# Patient Record
Sex: MALE | URBAN - NONMETROPOLITAN AREA
[De-identification: names, ages, dates, MRNs, and addresses within clinical notes are randomized per-mention and may not be internally consistent; named-entity substitution may affect disease eponyms.]

---

## 2023-06-16 ENCOUNTER — OFFICE VISIT (OUTPATIENT)
Dept: PRIMARY CARE | Facility: CLINIC | Age: 33
End: 2023-06-16
Payer: COMMERCIAL

## 2023-06-16 VITALS
BODY MASS INDEX: 24.45 KG/M2 | WEIGHT: 184.5 LBS | HEIGHT: 73 IN | SYSTOLIC BLOOD PRESSURE: 128 MMHG | HEART RATE: 74 BPM | DIASTOLIC BLOOD PRESSURE: 88 MMHG

## 2023-06-16 DIAGNOSIS — M94.0 COSTOCHONDRITIS: Primary | ICD-10-CM

## 2023-06-16 DIAGNOSIS — F41.1 GENERALIZED ANXIETY DISORDER: ICD-10-CM

## 2023-06-16 DIAGNOSIS — F84.5 ASPERGER SYNDROME (HHS-HCC): ICD-10-CM

## 2023-06-16 PROCEDURE — 99203 OFFICE O/P NEW LOW 30 MIN: CPT | Performed by: FAMILY MEDICINE

## 2023-06-16 PROCEDURE — 1036F TOBACCO NON-USER: CPT | Performed by: FAMILY MEDICINE

## 2023-06-16 RX ORDER — BUSPIRONE HYDROCHLORIDE 10 MG/1
15 TABLET ORAL 2 TIMES DAILY
COMMUNITY

## 2023-06-16 RX ORDER — MECLIZINE HYDROCHLORIDE 25 MG/1
25 TABLET ORAL 3 TIMES DAILY PRN
COMMUNITY
Start: 2023-02-22 | End: 2024-01-22 | Stop reason: ALTCHOICE

## 2023-06-16 RX ORDER — KETOROLAC TROMETHAMINE 10 MG/1
10 TABLET, FILM COATED ORAL 3 TIMES DAILY PRN
COMMUNITY
Start: 2023-02-22 | End: 2024-01-22 | Stop reason: ALTCHOICE

## 2023-06-16 RX ORDER — FLUOXETINE 10 MG/1
10 TABLET ORAL DAILY
COMMUNITY

## 2023-06-16 RX ORDER — LORAZEPAM 1 MG/1
1 TABLET ORAL EVERY 6 HOURS PRN
COMMUNITY

## 2023-06-16 ASSESSMENT — PATIENT HEALTH QUESTIONNAIRE - PHQ9
SUM OF ALL RESPONSES TO PHQ9 QUESTIONS 1 AND 2: 0
2. FEELING DOWN, DEPRESSED OR HOPELESS: NOT AT ALL
1. LITTLE INTEREST OR PLEASURE IN DOING THINGS: NOT AT ALL

## 2023-06-16 ASSESSMENT — ENCOUNTER SYMPTOMS
SHORTNESS OF BREATH: 0
COUGH: 0
NAUSEA: 1
VOMITING: 0
SINUS PRESSURE: 0

## 2023-06-16 NOTE — PROGRESS NOTES
"Subjective   Patient ID: Bright Edward is a 33 y.o. male who presents for New Patient Visit (Pt. Here to establish care./).    HPI  Mother present for history   Psychiatirist  tele [place]  [employer]   Has been [employer] in 6 th grade  ADHD did not do well with ritalin  and tried strattera      Some racing thoughts      Social and grades delayed bullyed in school        Panic Attacks and know symptoms      2 this year      Recognizes symptoms     Feb ED visit for nausea and dizziness had chest pain     Took antivert for 2 days     Toradol 10 mg  still has some tabs  left     Last taken every 2 to 3 weeks      Hiro lower ribs right > left      Bending makes worse sometimes     Flare up last 15 to 20 mintues     Cxr was normal feb 2023     [employer]    Works wiring and manufacturing used to work at [employer] had a severe Panic Attack     Migraine 2 x per month      Sleep helps and not debilitaiting            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    Respiratory:  Negative for cough and shortness of breath.    Gastrointestinal:  Positive for nausea (with vertigo). Negative for vomiting.       Objective   /88   Pulse 74   Ht 1.845 m (6' 0.64\")   Wt 83.7 kg (184 lb 8 oz)   BMI 24.59 kg/m²     Physical Exam  Constitutional:       Appearance: Normal appearance.   HENT:      Head: Normocephalic and atraumatic.   Eyes:      Conjunctiva/sclera: Conjunctivae normal.      Pupils: Pupils are equal, round, and reactive to light.   Cardiovascular:      Rate and Rhythm: Normal rate and regular rhythm.      Heart sounds: Normal heart sounds.   Pulmonary:      Effort: Pulmonary effort is normal.      Breath sounds: Normal breath sounds.   Abdominal:      General: Abdomen is flat. Bowel sounds are normal. There is no distension.      " Palpations: Abdomen is soft. There is no mass.      Tenderness: There is no abdominal tenderness. There is no guarding.   Musculoskeletal:      Comments: No tenderness to palpation of bilateral lower ribs   Lymphadenopathy:      Cervical: No cervical adenopathy.   Skin:     Coloration: Skin is not jaundiced.   Neurological:      General: No focal deficit present.      Mental Status: He is alert and oriented to person, place, and time.      Comments: Rodriguez shah     Psychiatric:         Mood and Affect: Mood normal.         Behavior: Behavior normal.         Thought Content: Thought content normal.         Judgment: Judgment normal.         Assessment/Plan   Diagnoses and all orders for this visit:  Costochondritis  Asperger syndrome  Generalized anxiety disorder       Meds will be manage by psychiatry  Toradol prn until gone then change to aleve.

## 2023-07-17 ENCOUNTER — OFFICE VISIT (OUTPATIENT)
Dept: PRIMARY CARE | Facility: CLINIC | Age: 33
End: 2023-07-17
Payer: COMMERCIAL

## 2023-07-17 VITALS
HEART RATE: 77 BPM | OXYGEN SATURATION: 96 % | WEIGHT: 209.5 LBS | TEMPERATURE: 98.2 F | DIASTOLIC BLOOD PRESSURE: 76 MMHG | SYSTOLIC BLOOD PRESSURE: 115 MMHG | BODY MASS INDEX: 27.92 KG/M2

## 2023-07-17 DIAGNOSIS — R06.02 SHORTNESS OF BREATH: Primary | ICD-10-CM

## 2023-07-17 DIAGNOSIS — J40 BRONCHITIS: ICD-10-CM

## 2023-07-17 DIAGNOSIS — R05.1 ACUTE COUGH: ICD-10-CM

## 2023-07-17 PROCEDURE — 1036F TOBACCO NON-USER: CPT | Performed by: STUDENT IN AN ORGANIZED HEALTH CARE EDUCATION/TRAINING PROGRAM

## 2023-07-17 PROCEDURE — 99213 OFFICE O/P EST LOW 20 MIN: CPT | Performed by: STUDENT IN AN ORGANIZED HEALTH CARE EDUCATION/TRAINING PROGRAM

## 2023-07-17 RX ORDER — AZITHROMYCIN 250 MG/1
TABLET, FILM COATED ORAL
Qty: 6 TABLET | Refills: 0 | Status: SHIPPED | OUTPATIENT
Start: 2023-07-17 | End: 2023-07-22

## 2023-07-17 RX ORDER — BENZONATATE 200 MG/1
200 CAPSULE ORAL 3 TIMES DAILY PRN
Qty: 42 CAPSULE | Refills: 0 | Status: SHIPPED | OUTPATIENT
Start: 2023-07-17 | End: 2023-08-16

## 2023-07-17 NOTE — PROGRESS NOTES
Subjective   Patient ID: Bright Edward is a 33 y.o. male who presents for evaluation of respiratory symptoms. Here with mother.    HPI     Runny nose, sore throat, headache, cough and nose bleeds and had some SOB. Fuzzy vision and is sore from coughing so much in his abdomen. Pressure in both inner ears. SX started Wednesday last week, then on the weekend it got worse, did take OTC meds and it did not improve, is now coughing up brown mucus, there is also times it is green and yellow. Denies fever. Denies chills. Did test for covid x2 and both have been negative. Is using a humidifier and does not seem to help. Is prone to nose bleeds and has bleeding now.     Review of Systems  ROS negative except discussed above in HPI.    Vitals:    07/17/23 1550   BP: 115/76   Pulse: 77   Temp: 36.8 °C (98.2 °F)   SpO2: 96%     Objective   Physical Exam  Constitutional:       Appearance: Normal appearance.   HENT:      Nose: Congestion present.      Comments: Dried blood in the right nostril is noticed.  Cardiovascular:      Rate and Rhythm: Normal rate and regular rhythm.   Pulmonary:      Effort: Pulmonary effort is normal.      Breath sounds: Wheezing and rales present.   Musculoskeletal:      Cervical back: Normal range of motion and neck supple.   Lymphadenopathy:      Cervical: No cervical adenopathy.   Neurological:      Mental Status: He is alert.           Assessment/Plan   Bright was seen today for sick.  Diagnoses and all orders for this visit:  Shortness of breath (Primary)  -     azithromycin (Zithromax) 250 mg tablet; Take 2 tablets (500 mg) by mouth once daily for 1 day, THEN 1 tablet (250 mg) once daily for 4 days. Take 2 tabs (500 mg) by mouth today, than 1 daily for 4 days..  -     benzonatate (Tessalon) 200 mg capsule; Take 1 capsule (200 mg) by mouth 3 times a day as needed for cough. Do not crush or chew.  -     XR chest 2 views; Future  -     XR chest 2 views  Acute cough  -     azithromycin  (Zithromax) 250 mg tablet; Take 2 tablets (500 mg) by mouth once daily for 1 day, THEN 1 tablet (250 mg) once daily for 4 days. Take 2 tabs (500 mg) by mouth today, than 1 daily for 4 days..  -     benzonatate (Tessalon) 200 mg capsule; Take 1 capsule (200 mg) by mouth 3 times a day as needed for cough. Do not crush or chew.  -     XR chest 2 views; Future  -     XR chest 2 views  Bronchitis  -     azithromycin (Zithromax) 250 mg tablet; Take 2 tablets (500 mg) by mouth once daily for 1 day, THEN 1 tablet (250 mg) once daily for 4 days. Take 2 tabs (500 mg) by mouth today, than 1 daily for 4 days..      Follow up as needed         Giovanni Cameron MD MPH

## 2023-07-25 ENCOUNTER — TELEPHONE (OUTPATIENT)
Dept: PRIMARY CARE | Facility: CLINIC | Age: 33
End: 2023-07-25

## 2023-07-25 ENCOUNTER — OFFICE VISIT (OUTPATIENT)
Dept: PRIMARY CARE | Facility: CLINIC | Age: 33
End: 2023-07-25
Payer: COMMERCIAL

## 2023-07-25 VITALS
DIASTOLIC BLOOD PRESSURE: 70 MMHG | WEIGHT: 210.8 LBS | BODY MASS INDEX: 28.09 KG/M2 | OXYGEN SATURATION: 99 % | HEART RATE: 72 BPM | SYSTOLIC BLOOD PRESSURE: 120 MMHG

## 2023-07-25 DIAGNOSIS — H66.90 ACUTE OTITIS MEDIA, UNSPECIFIED OTITIS MEDIA TYPE: ICD-10-CM

## 2023-07-25 DIAGNOSIS — H91.91 DECREASED HEARING OF RIGHT EAR: Primary | ICD-10-CM

## 2023-07-25 PROCEDURE — 1036F TOBACCO NON-USER: CPT | Performed by: STUDENT IN AN ORGANIZED HEALTH CARE EDUCATION/TRAINING PROGRAM

## 2023-07-25 PROCEDURE — 99213 OFFICE O/P EST LOW 20 MIN: CPT | Performed by: STUDENT IN AN ORGANIZED HEALTH CARE EDUCATION/TRAINING PROGRAM

## 2023-07-25 RX ORDER — DOXYCYCLINE 100 MG/1
100 CAPSULE ORAL 2 TIMES DAILY
Qty: 14 CAPSULE | Refills: 0 | Status: SHIPPED | OUTPATIENT
Start: 2023-07-25 | End: 2023-08-01

## 2023-07-25 NOTE — PROGRESS NOTES
Subjective   Patient ID: Bright Edward is a 33 y.o. male who presents for Sick ([facility] visit).    HPI    Reports that the meds worked for knocking out past sickness, he feels better, more energy. Still a sore dry throat. The ear pressure has stuck around, the right one is the worst than the left, states it is affecting his hearing. Cough is better sometimes still has coughing spells. Denies drainage from the ear. Denies odor from the ears. Denies fever. Denies dizziness. Sunday the pressure was more intense. States he cleans his ears regularly. Has tried OTC meds to see if it helps, denies helping.     Review of Systems  ROS negative except discussed above in HPI.    Vitals:    07/25/23 1632   BP: 120/70   Pulse: 72   SpO2: 99%     Objective   Physical Exam  Constitutional:       Appearance: Normal appearance.   HENT:      Ears:      Comments: Middle ear effusion noticed in the right ear.   Cardiovascular:      Rate and Rhythm: Normal rate and regular rhythm.      Pulses: Normal pulses.      Heart sounds: Normal heart sounds.   Pulmonary:      Effort: Pulmonary effort is normal.      Breath sounds: Normal breath sounds.   Neurological:      Mental Status: He is alert.           Assessment/Plan   Bright was seen today for sick.  Diagnoses and all orders for this visit:  Decreased hearing of right ear (Primary)  -     Referral to ENT; Future  Acute otitis media, unspecified otitis media type  -     doxycycline (Vibramycin) 100 mg capsule; Take 1 capsule (100 mg) by mouth 2 times a day for 7 days. Take with at least 8 ounces (large glass) of water, do not lie down for 30 minutes after      Follow up as needed     Giovanni Cameron MD MPH

## 2023-07-25 NOTE — TELEPHONE ENCOUNTER
Mom stating patient seen Dr. Cameron about a week ago. Stating he's feeling better, but having trouble hearing and feels like ears are stuffy. Asking what he should do. Asking for a call back at [phone].

## 2023-10-12 ENCOUNTER — TELEMEDICINE (OUTPATIENT)
Dept: PRIMARY CARE | Facility: CLINIC | Age: 33
End: 2023-10-12
Payer: COMMERCIAL

## 2023-10-12 DIAGNOSIS — R68.89 FLU-LIKE SYMPTOMS: Primary | ICD-10-CM

## 2023-10-12 PROCEDURE — 87636 SARSCOV2 & INF A&B AMP PRB: CPT

## 2023-10-12 PROCEDURE — 99213 OFFICE O/P EST LOW 20 MIN: CPT | Performed by: NURSE PRACTITIONER

## 2023-10-12 ASSESSMENT — ENCOUNTER SYMPTOMS
HEADACHES: 1
DIZZINESS: 0
LIGHT-HEADEDNESS: 0
ABDOMINAL PAIN: 0
TROUBLE SWALLOWING: 1
VOMITING: 0
DIAPHORESIS: 0
ACTIVITY CHANGE: 0
SWOLLEN GLANDS: 0
DIARRHEA: 0
NECK PAIN: 0
CHEST TIGHTNESS: 0
FATIGUE: 0
FEVER: 0
SORE THROAT: 1
CHILLS: 0
HOARSE VOICE: 1
STRIDOR: 1
SHORTNESS OF BREATH: 0
MYALGIAS: 1
COUGH: 1

## 2023-10-12 NOTE — PROGRESS NOTES
Subjective   Patient ID: Bright Edward is a 33 y.o. male who presents for covid.    Home COVID tests negative  Sx include sore throat, cough, joint aches, HA, nasal congestion.   Sx onset Monday  Family member COVID+  Denies fever, SOB        Sore Throat   This is a new problem. The current episode started in the past 7 days. The problem has been waxing and waning. Neither side of throat is experiencing more pain than the other. There has been no fever. The pain is at a severity of 5/10. Associated symptoms include congestion, coughing, headaches, a hoarse voice, stridor and trouble swallowing. Pertinent negatives include no abdominal pain, diarrhea, drooling, ear discharge, ear pain, plugged ear sensation, neck pain, shortness of breath, swollen glands or vomiting. He has had no exposure to strep or mono. He has tried acetaminophen for the symptoms.        Review of Systems   Constitutional:  Negative for activity change, chills, diaphoresis, fatigue and fever.   HENT:  Positive for congestion, hoarse voice, sore throat and trouble swallowing. Negative for drooling, ear discharge and ear pain.    Respiratory:  Positive for cough and stridor. Negative for chest tightness and shortness of breath.    Cardiovascular:  Negative for chest pain.   Gastrointestinal:  Negative for abdominal pain, diarrhea and vomiting.   Musculoskeletal:  Positive for myalgias. Negative for neck pain.   Neurological:  Positive for headaches. Negative for dizziness and light-headedness.       Objective   There were no vitals taken for this visit.    Physical Exam  Constitutional:       General: He is not in acute distress.     Appearance: Normal appearance. He is not ill-appearing.      Comments: Unable to perform complete physical exam due to virtual visit, patient was visualized on interactive video.    Pulmonary:      Effort: Pulmonary effort is normal.   Neurological:      Mental Status: He is alert and oriented to person, place,  and time.         Assessment/Plan   Diagnoses and all orders for this visit:  Flu-like symptoms  -     Sars-CoV-2 PCR, Symptomatic; Future  -     Influenza A, and B PCR; Future  Use OTC Tylenol as needed for pain/fever and Mucinex or Robitussin as needed for cough/congestion  Return if symptoms persist or worsen

## 2023-10-12 NOTE — LETTER
October 12, 2023     Patient: Bright Edward   YOB: 1990   Date of Visit: 10/12/2023       To Whom It May Concern:    Bright Edward was seen in my clinic on 10/12/2023 at 9:30 am. Please excuse Bright for his absence from work on this day to make the appointment.  Please excuse absence on 10/11/2023 due to illness    If you have any questions or concerns, please don't hesitate to call.         Sincerely,         Amaury French, APRN-CNP        CC: No Recipients

## 2023-10-13 ENCOUNTER — TELEPHONE (OUTPATIENT)
Dept: PRIMARY CARE | Facility: CLINIC | Age: 33
End: 2023-10-13
Payer: COMMERCIAL

## 2023-10-13 LAB
FLUAV RNA RESP QL NAA+PROBE: NOT DETECTED
FLUBV RNA RESP QL NAA+PROBE: NOT DETECTED
SARS-COV-2 RNA RESP QL NAA+PROBE: DETECTED

## 2023-12-15 ENCOUNTER — APPOINTMENT (OUTPATIENT)
Dept: PRIMARY CARE | Facility: CLINIC | Age: 33
End: 2023-12-15

## 2024-01-02 ENCOUNTER — APPOINTMENT (OUTPATIENT)
Dept: PRIMARY CARE | Facility: CLINIC | Age: 34
End: 2024-01-02

## 2024-01-22 ENCOUNTER — OFFICE VISIT (OUTPATIENT)
Dept: PRIMARY CARE | Facility: CLINIC | Age: 34
End: 2024-01-22
Payer: COMMERCIAL

## 2024-01-22 VITALS
HEIGHT: 74 IN | WEIGHT: 207.8 LBS | OXYGEN SATURATION: 96 % | HEART RATE: 78 BPM | BODY MASS INDEX: 26.67 KG/M2 | DIASTOLIC BLOOD PRESSURE: 80 MMHG | TEMPERATURE: 97.3 F | SYSTOLIC BLOOD PRESSURE: 118 MMHG

## 2024-01-22 DIAGNOSIS — Z13.220 SCREENING CHOLESTEROL LEVEL: Primary | ICD-10-CM

## 2024-01-22 PROCEDURE — 1036F TOBACCO NON-USER: CPT | Performed by: FAMILY MEDICINE

## 2024-01-22 PROCEDURE — 99213 OFFICE O/P EST LOW 20 MIN: CPT | Performed by: FAMILY MEDICINE

## 2024-01-22 NOTE — PROGRESS NOTES
"Subjective   Patient ID: Bright Edward is a 33 y.o. male who presents for Anxiety and Asperger Syndrome.    Anxiety           Psychiatrist  Dr Rogelio Baker rx ativan     Costochondritis occ takes aleve for hands and chest with twisting and turning   Gets brief sharpness and stab    Weight is down     Migraine    1 per week takes excedrin or tylenol if late in the day     No am FERNANDEZ     Patient is here today for a 6 month follow up:     No changes in his anxiety or Asperger Syndrome     He had Covid in October 2023     Has had  bloody nose due to dry air and the weather, he is using a humidifer with saline air   Ayr spray and neosporin  No trouble stopping nose bleeds     Fxhx DM MGF     Mother and GF have familial hyperlipidemia and on repatha  Pt father has good cholesterol       Review of Systems    Objective   /80 (BP Location: Left arm, Patient Position: Sitting)   Pulse 78   Temp 36.3 °C (97.3 °F)   Ht 1.867 m (6' 1.5\")   Wt 94.3 kg (207 lb 12.8 oz)   SpO2 96%   BMI 27.04 kg/m²     Physical Exam  Vitals reviewed.   Constitutional:       Appearance: Normal appearance.   HENT:      Head: Normocephalic and atraumatic.   Eyes:      Conjunctiva/sclera: Conjunctivae normal.   Cardiovascular:      Rate and Rhythm: Normal rate and regular rhythm.   Pulmonary:      Effort: Pulmonary effort is normal.      Breath sounds: Normal breath sounds.   Musculoskeletal:      Cervical back: Neck supple.   Skin:     General: Skin is warm and dry.   Neurological:      General: No focal deficit present.      Mental Status: He is alert and oriented to person, place, and time.   Psychiatric:         Mood and Affect: Mood normal.         Behavior: Behavior normal.         Thought Content: Thought content normal.         Judgment: Judgment normal.         Assessment/Plan   Diagnoses and all orders for this visit:  Screening cholesterol level  -     Lipid Panel; Future         "

## 2024-01-22 NOTE — PROGRESS NOTES
Patient is here today for a 6 month follow up:    No changes in his anxiety or Asperger Syndrome    He had Covid in October 2023    Has had  blood bleeds due to dry air and the weather, he is using a humidifer with saline air.

## 2025-01-27 ENCOUNTER — APPOINTMENT (OUTPATIENT)
Dept: PRIMARY CARE | Facility: CLINIC | Age: 35
End: 2025-01-27
Payer: COMMERCIAL

## 2025-01-27 VITALS
HEART RATE: 81 BPM | BODY MASS INDEX: 27.77 KG/M2 | SYSTOLIC BLOOD PRESSURE: 120 MMHG | WEIGHT: 213.4 LBS | OXYGEN SATURATION: 96 % | DIASTOLIC BLOOD PRESSURE: 72 MMHG

## 2025-01-27 DIAGNOSIS — Z13.220 SCREENING CHOLESTEROL LEVEL: ICD-10-CM

## 2025-01-27 DIAGNOSIS — F41.1 GENERALIZED ANXIETY DISORDER: ICD-10-CM

## 2025-01-27 DIAGNOSIS — Z00.00 ANNUAL WELLNESS VISIT: Primary | ICD-10-CM

## 2025-01-27 DIAGNOSIS — F84.5 ASPERGER SYNDROME: ICD-10-CM

## 2025-01-27 PROCEDURE — 1036F TOBACCO NON-USER: CPT | Performed by: FAMILY MEDICINE

## 2025-01-27 PROCEDURE — 99395 PREV VISIT EST AGE 18-39: CPT | Performed by: FAMILY MEDICINE

## 2025-01-27 NOTE — PROGRESS NOTES
Subjective   Patient ID: Bright Edward is a 34 y.o. male who presents for Annual Exam.    HPI   Here for yearly well check.  Psychiatrist  Dr Rogelio Baker rx ativan      Weight is up    Discussed ways to lose weight and exercise     Migraine     3  per month  takes excedrin or tylenol if late in the day     No work lost to HA     Wires control panels          No changes in his anxiety or Asperger Syndrome        Has had  bloody nose due to dry air and the weather, he is using a humidifer with saline air   Ayr spray and neosporin  No trouble stopping nose bleeds   5 to 10 minutes nose bleeds   Has seen ENT for cauterization but are treating conservatively     Fxhx DM MGF      Mother and GF have familial hyperlipidemia and on repatha  Pt father has good cholesterol     Review of Systems    Objective   /72   Pulse 81   Wt 96.8 kg (213 lb 6.4 oz)   SpO2 96%   BMI 27.77 kg/m²     Physical Exam  Vitals reviewed.   Constitutional:       Appearance: Normal appearance.   HENT:      Head: Normocephalic and atraumatic.   Eyes:      Conjunctiva/sclera: Conjunctivae normal.   Cardiovascular:      Rate and Rhythm: Normal rate and regular rhythm.   Pulmonary:      Effort: Pulmonary effort is normal.      Breath sounds: Normal breath sounds.   Musculoskeletal:      Cervical back: Neck supple.   Skin:     General: Skin is warm and dry.   Neurological:      General: No focal deficit present.      Mental Status: He is alert and oriented to person, place, and time.   Psychiatric:         Mood and Affect: Mood normal.         Behavior: Behavior normal.         Thought Content: Thought content normal.         Judgment: Judgment normal.         Assessment/Plan   Diagnoses and all orders for this visit:  Annual wellness visit  Generalized anxiety disorder  Screening cholesterol level  -     Lipid Panel; Future  Asperger syndrome  Never got a lipid done last year.

## 2025-01-27 NOTE — PATIENT INSTRUCTIONS
To improve your mental health and maintain ideal body weight you can do these activities .     150 minutes of exercise weekly. This is in addition to your normal work.  This is as easy as a brisk fast-paced walk 30 minutes 5 days/week.   Eat a nutritionally balanced breakfast.    Eat no fried foods.  Baked is better.    Choose low-fat options.    Limit nighttime snacks to 150 santy or less 1 to 2 hours before bed.   Drink more water in place of sugar beverages.

## 2026-01-27 ENCOUNTER — APPOINTMENT (OUTPATIENT)
Dept: PRIMARY CARE | Facility: CLINIC | Age: 36
End: 2026-01-27
Payer: COMMERCIAL